# Patient Record
Sex: FEMALE | Race: BLACK OR AFRICAN AMERICAN | NOT HISPANIC OR LATINO | Employment: UNEMPLOYED | URBAN - METROPOLITAN AREA
[De-identification: names, ages, dates, MRNs, and addresses within clinical notes are randomized per-mention and may not be internally consistent; named-entity substitution may affect disease eponyms.]

---

## 2019-06-12 ENCOUNTER — LAB VISIT (OUTPATIENT)
Dept: LAB | Facility: HOSPITAL | Age: 44
End: 2019-06-12
Attending: FAMILY MEDICINE
Payer: MEDICAID

## 2019-06-12 ENCOUNTER — OFFICE VISIT (OUTPATIENT)
Dept: FAMILY MEDICINE | Facility: CLINIC | Age: 44
End: 2019-06-12
Payer: MEDICAID

## 2019-06-12 VITALS
HEIGHT: 64 IN | BODY MASS INDEX: 31.02 KG/M2 | SYSTOLIC BLOOD PRESSURE: 118 MMHG | DIASTOLIC BLOOD PRESSURE: 70 MMHG | HEART RATE: 77 BPM | TEMPERATURE: 98 F | WEIGHT: 181.69 LBS | OXYGEN SATURATION: 99 %

## 2019-06-12 DIAGNOSIS — N63.0 BREAST LUMP: ICD-10-CM

## 2019-06-12 DIAGNOSIS — Z78.9 HISTORY OF INCARCERATION: ICD-10-CM

## 2019-06-12 DIAGNOSIS — Z23 NEED FOR DIPHTHERIA-TETANUS-PERTUSSIS (TDAP) VACCINE: ICD-10-CM

## 2019-06-12 DIAGNOSIS — Z11.3 ROUTINE SCREENING FOR STI (SEXUALLY TRANSMITTED INFECTION): ICD-10-CM

## 2019-06-12 DIAGNOSIS — Z01.419 WELL WOMAN EXAM: Primary | ICD-10-CM

## 2019-06-12 DIAGNOSIS — Z12.39 BREAST CANCER SCREENING: ICD-10-CM

## 2019-06-12 DIAGNOSIS — Z01.419 WELL WOMAN EXAM: ICD-10-CM

## 2019-06-12 DIAGNOSIS — Z12.4 CERVICAL CANCER SCREENING: ICD-10-CM

## 2019-06-12 LAB
BASOPHILS # BLD AUTO: 0.06 K/UL (ref 0–0.2)
BASOPHILS NFR BLD: 0.7 % (ref 0–1.9)
DIFFERENTIAL METHOD: ABNORMAL
EOSINOPHIL # BLD AUTO: 0.2 K/UL (ref 0–0.5)
EOSINOPHIL NFR BLD: 1.9 % (ref 0–8)
ERYTHROCYTE [DISTWIDTH] IN BLOOD BY AUTOMATED COUNT: 13.8 % (ref 11.5–14.5)
HCT VFR BLD AUTO: 42.3 % (ref 37–48.5)
HGB BLD-MCNC: 13.1 G/DL (ref 12–16)
IMM GRANULOCYTES # BLD AUTO: 0.02 K/UL (ref 0–0.04)
IMM GRANULOCYTES NFR BLD AUTO: 0.2 % (ref 0–0.5)
LYMPHOCYTES # BLD AUTO: 3.7 K/UL (ref 1–4.8)
LYMPHOCYTES NFR BLD: 43.2 % (ref 18–48)
MCH RBC QN AUTO: 29.9 PG (ref 27–31)
MCHC RBC AUTO-ENTMCNC: 31 G/DL (ref 32–36)
MCV RBC AUTO: 97 FL (ref 82–98)
MONOCYTES # BLD AUTO: 0.5 K/UL (ref 0.3–1)
MONOCYTES NFR BLD: 5.4 % (ref 4–15)
NEUTROPHILS # BLD AUTO: 4.2 K/UL (ref 1.8–7.7)
NEUTROPHILS NFR BLD: 48.6 % (ref 38–73)
NRBC BLD-RTO: 0 /100 WBC
PLATELET # BLD AUTO: 399 K/UL (ref 150–350)
PMV BLD AUTO: 10.8 FL (ref 9.2–12.9)
RBC # BLD AUTO: 4.38 M/UL (ref 4–5.4)
WBC # BLD AUTO: 8.59 K/UL (ref 3.9–12.7)

## 2019-06-12 PROCEDURE — 86592 SYPHILIS TEST NON-TREP QUAL: CPT

## 2019-06-12 PROCEDURE — 99999 PR PBB SHADOW E&M-NEW PATIENT-LVL IV: ICD-10-PCS | Mod: PBBFAC,,, | Performed by: FAMILY MEDICINE

## 2019-06-12 PROCEDURE — 86703 HIV-1/HIV-2 1 RESULT ANTBDY: CPT

## 2019-06-12 PROCEDURE — 80061 LIPID PANEL: CPT

## 2019-06-12 PROCEDURE — 99386 PR PREVENTIVE VISIT,NEW,40-64: ICD-10-PCS | Mod: S$PBB,25,, | Performed by: FAMILY MEDICINE

## 2019-06-12 PROCEDURE — 99999 PR PBB SHADOW E&M-NEW PATIENT-LVL IV: CPT | Mod: PBBFAC,,, | Performed by: FAMILY MEDICINE

## 2019-06-12 PROCEDURE — 99386 PREV VISIT NEW AGE 40-64: CPT | Mod: S$PBB,25,, | Performed by: FAMILY MEDICINE

## 2019-06-12 PROCEDURE — 82306 VITAMIN D 25 HYDROXY: CPT

## 2019-06-12 PROCEDURE — 85025 COMPLETE CBC W/AUTO DIFF WBC: CPT

## 2019-06-12 PROCEDURE — 84443 ASSAY THYROID STIM HORMONE: CPT

## 2019-06-12 PROCEDURE — 90471 IMMUNIZATION ADMIN: CPT | Mod: PBBFAC,PO

## 2019-06-12 PROCEDURE — 99204 OFFICE O/P NEW MOD 45 MIN: CPT | Mod: PBBFAC,PO | Performed by: FAMILY MEDICINE

## 2019-06-12 PROCEDURE — 83036 HEMOGLOBIN GLYCOSYLATED A1C: CPT

## 2019-06-12 PROCEDURE — 80074 ACUTE HEPATITIS PANEL: CPT

## 2019-06-12 PROCEDURE — 80053 COMPREHEN METABOLIC PANEL: CPT

## 2019-06-12 NOTE — PATIENT INSTRUCTIONS
?Avoid tobacco  ?Be physically active  ?Maintain a healthy weight  ?Eat a diet rich in fruits, vegetables, and whole grains, and low in saturated/trans fat  ?Limit alcohol consumption  ?Protect against sexually transmitted infections  ?Avoid excess sun  RTC as directed during the visit, as needed or in 1 year for a physical with labs prior. Call one month prior to the physical to schedule apt and labs.     Due to history of incarceration will screen for Tb and STI including HIV  TDAP today  Labs today  Mammogram in 3 months  Will request records for mammogram and pap smear  Eat healthy! Low fat diet. Increase exercise      4 Steps for Eating Healthier  Changing the way you eat can improve your health. It can lower your cholesterol and blood pressure, and help you stay at a healthy weight. Your diet doesnt have to be bland and boring to be healthy. Just watch your calories and follow these steps:    1. Eat fewer unhealthy fats  · Choose more fish and lean meats instead of fatty cuts of meat.  · Skip butter and lard, and use less margarine.  · Pass on foods that have palm, coconut, or hydrogenated oils.  · Eat fewer high-fat dairy foods like cheese, ice cream, and whole milk.  · Get a heart-healthy cookbook and try some low-fat recipes.  2. Go light on salt  · Keep the saltshaker off the table.  · Limit high-salt ingredients, such as soy sauce, bouillon, and garlic salt.  · Instead of adding salt when cooking, season your food with herbs and flavorings. Try lemon, garlic, and onion.  · Limit convenience foods, such as boxed or canned foods and restaurant food.  · Read food labels and choose lower-sodium options.  3. Limit sugar  · Pause before you add sugars to pancakes, cereal, coffee, or tea. This includes white and brown table sugar, syrup, honey, and molasses. Cut your usual amount by half.  · Use non-sugar sweeteners. Stevia, aspartame, and sucralose can satisfy a sweet tooth without adding calories.  · Swap out  sugar-filled soda and other drinks. Buy sugar-free or low-calorie beverages. Remember water is always the best choice.  · Read labels and choose foods with less added sugar. Keep in mind that dairy foods and foods with fruit will have some natural sugar.  · Cut the sugar in recipes by 1/3 to 1/2. Boost the flavor with extracts like almond, vanilla, or orange. Or add spices such as cinnamon or nutmeg.  4. Eat more fiber  · Eat fresh fruits and vegetables every day.  · Boost your diet with whole grains. Go for oats, whole-grain rice, and bran.  · Add beans and lentils to your meals.  · Drink more water to match your fiber increase. This is to help prevent constipation.  Date Last Reviewed: 5/11/2015  © 0314-5386 [employer]. [address] [place] [address]. All rights reserved. This information is not intended as a substitute for professional medical care. Always follow your healthcare professional's instructions.        Understanding Fat and Cholesterol  Too much cholesterol in your blood can lead to many problems such as blocked arteries. This can lead to heart attack and stroke. One of the best ways to manage heart and blood vessel disease is to lower your blood cholesterol. Planning meals that are low in saturated fat and cholesterol helps reduce the level of cholesterol in your blood. Below are eating tips to help lower your blood cholesterol levels.  Eat less fat  A healthy goal is to have less than 25% to 35% of your daily calories come from fat. Instead of fats, eat more fruits, whole-grains, and vegetables. This also helps control your weight, and can even reduce your risk for some cancers. There are different kinds of fats in foods. Fats can be saturated, unsaturated, or trans fats. The best fats to choose are unsaturated fats. But fats are high in calories, so eat even unsaturated fats sparingly.  Limit foods high in saturated fats  Saturated fats come from animals and certain plants  (such as coconut and palm). Eating too much saturated fat can raise your blood cholesterol levels and make your artery problems worse. Your goal is to eat less saturated fat. Below are some examples of foods that contain lots of saturated fat:  · Fatty cuts of meat (lamb, ham, beef)  · Many pastries, cakes, cookies, and candies  · Cream, ice cream, sour cream, cheese, and butter, and foods made with them  · Sauces made with butter or cream  · Salad dressings with saturated fats  · Foods that contain palm or coconut oil  Choose unsaturated fats  Unsaturated fats are usually liquid at room temperature. They are better choices for your heart than saturated fat. There are two types of unsaturated fats: polyunsaturated fat and monounsaturated fat. Aim to replace saturated fats with polyunsaturated or monounsaturated fats.  · Polyunsaturated fats are found in corn oil, safflower oil, sunflower oil, and other vegetable oils.  · Monounsaturated fats are found in olive oil, canola oil, and peanut oil. Some margarines and spreads are now made with these oils, too. Avocados are also high in monounsaturated fat.  Of all fats, monounsaturated fats are the least harmful to your heart.  Avoid trans fats  Like saturated fats, trans fats have been linked to heart disease. Even a small amount can harm your health. Trans fats are found in liquid oils that have been changed to be solid at room temperature. Margarine, which is often made from vegetable oil, is one example. Vegetable shortening is another. Trans fats are often found in packaged goods. Check ingredients for the words hydrogenated or partially hydrogenated. They mean the foods contain trans fat.  What about triglycerides?  Triglycerides are a type of fat in your blood. Like cholesterol, high levels of triglycerides can lead to blocked arteries. High triglyceride levels can be reduced 20% to 50%  by limiting added sugars in your diet, susbstituting healthier fats for  saturated and trans fats, getting more physical activity, and losing weight if your are overweight. You may also be advised to avoid or limi alcohol.    Reading food labels  Luckily, most foods now have labels giving you the facts about what youre eating. Reading food labels helps you make healthy choices. Look for the words highlighted below.  · Serving Size. This is the amount of food in 1 serving. If you eat larger portions, be sure to count more of everything: fat, calories, and cholesterol.  · Total Fat. Tells you how many grams (g) of fat are in 1 serving.  · Calories from Fat. This tells you the total number of calories from fat in 1 serving (there are 9 calories per gram of fat). Look for foods with the fewest calories from fat.  · Saturated Fat. Tells you how many grams (g) of saturated fat are in 1 serving.  · Trans Fat. Tells how many grams (g) of trans fat are in 1 serving.  · Cholesterol. Tells you how many milligrams (mg) of cholesterol are in 1 serving.  Date Last Reviewed: 5/11/2015  © 9196-1613 [employer]. [address] [place] [address], [place] [address]. All rights reserved. This information is not intended as a substitute for professional medical care. Always follow your healthcare professional's instructions.

## 2019-06-12 NOTE — PROGRESS NOTES
"Subjective:       Patient ID: Last Torres is a 43 y.o. female.    Chief Complaint: Annual Exam and Establish Care    Last Torres is a 43 y.o. female who presents today to establish care.     Diet: mostly home cooked meals. "Fatty Stuff." She has gained 20 pounds since she left senior living. No soda intake. She drinks sweet tea rarely. No coffee.   Exercise: no exercise currently.     She has a history of right breast lump. It was evaluated with diagnostic mammogram and US. These were all normal. This was done 3 months ago. They wanted follow up in 6 months.     Flu: next flu season  Tdap: ordered  Labs: ordered    C-scope: at 51    Mammogram: had this done 3 months ago. Records pending.   Pap: had a pap smear done in residential, 6 months ago. She did 15 years and just got out 6 months ago. Desires gynecology. Will refer.     PMHx: reviewed in EMR and updated  Meds: reviewed in EMR and updated  Shx: reviewed in EMR and updated  FMHx: reviewed in EMR and updated  Social: got out of [facility] 3 months ago. She is living with her mom. Her daughter and granddaughter are with her today. No pets at home. No smokers at home. She works at [employer] in [employer]. No IV drug use. No tattoos in senior living.       Review of Systems   Constitutional: Negative for activity change and appetite change.   Respiratory: Negative for cough, shortness of breath, wheezing and stridor.    Cardiovascular: Negative for chest pain.   Gastrointestinal: Negative for abdominal pain, diarrhea, nausea, rectal pain and vomiting.   Genitourinary: Negative for difficulty urinating and dysuria.   Musculoskeletal: Negative for neck stiffness.   Skin: Negative for color change and pallor.   Neurological: Negative for dizziness and light-headedness.   Psychiatric/Behavioral: Negative for behavioral problems.         Health Maintenance Due   Topic Date Due    Lipid Panel  1975    Mammogram  10/26/2016         Objective:     Vitals:    06/12/19 " 1507   BP: 118/70   Pulse: 77   Temp: 97.7 °F (36.5 °C)        Physical Exam   Constitutional: She is oriented to person, place, and time. She appears well-developed and well-nourished.   HENT:   Head: Normocephalic and atraumatic.   Right Ear: Tympanic membrane, external ear and ear canal normal.   Left Ear: Tympanic membrane, external ear and ear canal normal.   Nose: Nose normal.   Mouth/Throat: Mucous membranes are normal.   Mild cobblestoning noted   Eyes: Pupils are equal, round, and reactive to light. Conjunctivae are normal.   Neck: Normal range of motion.   Cardiovascular: Normal rate, regular rhythm and normal heart sounds.   Pulmonary/Chest: Effort normal and breath sounds normal. No respiratory distress.   Abdominal: Soft. Bowel sounds are normal. She exhibits no distension.   Genitourinary:   Genitourinary Comments: deferred    Musculoskeletal: She exhibits no edema.   Neurological: She is alert and oriented to person, place, and time. No cranial nerve deficit or sensory deficit. She exhibits normal muscle tone.   Skin: Skin is warm and dry.   Psychiatric: She has a normal mood and affect. Her speech is normal and behavior is normal. Judgment and thought content normal.   Nursing note and vitals reviewed.      Assessment:       1. Well woman exam    2. Cervical cancer screening    3. Breast lump    4. Breast cancer screening    5. BMI 31.0-31.9,adult    6. Need for diphtheria-tetanus-pertussis (Tdap) vaccine    7. Routine screening for STI (sexually transmitted infection)    8. History of incarceration. Released in March 2019        Plan:       Due to history of incarceration will screen for Tb and STI including HIV  TDAP today  Labs today  Mammogram in 3 months  Will request records for mammogram and pap smear  Eat healthy! Low fat diet. Increase exercise    Well woman exam  ?Avoid tobacco  ?Be physically active  ?Maintain a healthy weight  ?Eat a diet rich in fruits, vegetables, and whole grains, and  low in saturated/trans fat  ?Limit alcohol consumption  ?Protect against sexually transmitted infections  ?Avoid excess sun  RTC as directed during the visit, as needed or in 1 year for a physical with labs prior. Call one month prior to the physical to schedule apt and labs.   -     CBC auto differential; Future; Expected date: 06/12/2019  -     Comprehensive metabolic panel; Future; Expected date: 06/12/2019  -     Hemoglobin A1c; Future; Expected date: 06/12/2019  -     Lipid panel; Future; Expected date: 06/12/2019  -     TSH; Future; Expected date: 06/12/2019  -     Vitamin D; Future; Expected date: 06/12/2019    Cervical cancer screening  -     Ambulatory referral to Obstetrics / Gynecology    Breast lump    Breast cancer screening  -     Mammo Digital Screening Bilat with Tomosynthesis with CAD; Future; Expected date: 06/12/2019    BMI 31.0-31.9,adult    Need for diphtheria-tetanus-pertussis (Tdap) vaccine  -     Tdap Vaccine    Routine screening for STI (sexually transmitted infection)  -     HIV 1/2 Ag/Ab (4th Gen); Future; Expected date: 06/12/2019  -     RPR; Future; Expected date: 06/12/2019  -     Hepatitis panel, acute; Future; Expected date: 06/12/2019    History of incarceration. Released in March 2019  -     HIV 1/2 Ag/Ab (4th Gen); Future; Expected date: 06/12/2019  -     RPR; Future; Expected date: 06/12/2019  -     QUANTIFERON GOLD TB; Future; Expected date: 06/12/2019  -     Hepatitis panel, acute; Future; Expected date: 06/12/2019

## 2019-06-13 LAB
25(OH)D3+25(OH)D2 SERPL-MCNC: 18 NG/ML (ref 30–96)
ALBUMIN SERPL BCP-MCNC: 3.6 G/DL (ref 3.5–5.2)
ALP SERPL-CCNC: 83 U/L (ref 55–135)
ALT SERPL W/O P-5'-P-CCNC: 6 U/L (ref 10–44)
ANION GAP SERPL CALC-SCNC: 13 MMOL/L (ref 8–16)
AST SERPL-CCNC: 14 U/L (ref 10–40)
BILIRUB SERPL-MCNC: 0.2 MG/DL (ref 0.1–1)
BUN SERPL-MCNC: 7 MG/DL (ref 6–20)
CALCIUM SERPL-MCNC: 8.9 MG/DL (ref 8.7–10.5)
CHLORIDE SERPL-SCNC: 105 MMOL/L (ref 95–110)
CHOLEST SERPL-MCNC: 146 MG/DL (ref 120–199)
CHOLEST/HDLC SERPL: 2.1 {RATIO} (ref 2–5)
CO2 SERPL-SCNC: 22 MMOL/L (ref 23–29)
CREAT SERPL-MCNC: 0.8 MG/DL (ref 0.5–1.4)
EST. GFR  (AFRICAN AMERICAN): >60 ML/MIN/1.73 M^2
EST. GFR  (NON AFRICAN AMERICAN): >60 ML/MIN/1.73 M^2
ESTIMATED AVG GLUCOSE: 123 MG/DL (ref 68–131)
GLUCOSE SERPL-MCNC: 83 MG/DL (ref 70–110)
HAV IGM SERPL QL IA: NEGATIVE
HBA1C MFR BLD HPLC: 5.9 % (ref 4–5.6)
HBV CORE IGM SERPL QL IA: NEGATIVE
HBV SURFACE AG SERPL QL IA: NEGATIVE
HCV AB SERPL QL IA: NEGATIVE
HDLC SERPL-MCNC: 70 MG/DL (ref 40–75)
HDLC SERPL: 47.9 % (ref 20–50)
HIV 1+2 AB+HIV1 P24 AG SERPL QL IA: NEGATIVE
LDLC SERPL CALC-MCNC: 66.4 MG/DL (ref 63–159)
NONHDLC SERPL-MCNC: 76 MG/DL
POTASSIUM SERPL-SCNC: 3.6 MMOL/L (ref 3.5–5.1)
PROT SERPL-MCNC: 7.6 G/DL (ref 6–8.4)
RPR SER QL: NORMAL
SODIUM SERPL-SCNC: 140 MMOL/L (ref 136–145)
TRIGL SERPL-MCNC: 48 MG/DL (ref 30–150)
TSH SERPL DL<=0.005 MIU/L-ACNC: 1.8 UIU/ML (ref 0.4–4)

## 2019-07-11 ENCOUNTER — TELEPHONE (OUTPATIENT)
Dept: ADMINISTRATIVE | Facility: HOSPITAL | Age: 44
End: 2019-07-11

## 2019-09-12 ENCOUNTER — OFFICE VISIT (OUTPATIENT)
Dept: OBSTETRICS AND GYNECOLOGY | Facility: CLINIC | Age: 44
End: 2019-09-12
Payer: MEDICAID

## 2019-09-12 VITALS — WEIGHT: 181.44 LBS | BODY MASS INDEX: 30.98 KG/M2 | HEIGHT: 64 IN

## 2019-09-12 DIAGNOSIS — Z12.31 SCREENING MAMMOGRAM, ENCOUNTER FOR: Primary | ICD-10-CM

## 2019-09-12 DIAGNOSIS — N63.0 BREAST MASS: ICD-10-CM

## 2019-09-12 PROCEDURE — 99999 PR PBB SHADOW E&M-EST. PATIENT-LVL III: CPT | Mod: PBBFAC,,, | Performed by: OBSTETRICS & GYNECOLOGY

## 2019-09-12 PROCEDURE — 99202 OFFICE O/P NEW SF 15 MIN: CPT | Mod: S$PBB,,, | Performed by: OBSTETRICS & GYNECOLOGY

## 2019-09-12 PROCEDURE — 99202 PR OFFICE/OUTPT VISIT, NEW, LEVL II, 15-29 MIN: ICD-10-PCS | Mod: S$PBB,,, | Performed by: OBSTETRICS & GYNECOLOGY

## 2019-09-12 PROCEDURE — 99999 PR PBB SHADOW E&M-EST. PATIENT-LVL III: ICD-10-PCS | Mod: PBBFAC,,, | Performed by: OBSTETRICS & GYNECOLOGY

## 2019-09-12 PROCEDURE — 99213 OFFICE O/P EST LOW 20 MIN: CPT | Mod: PBBFAC,PO | Performed by: OBSTETRICS & GYNECOLOGY

## 2019-09-12 NOTE — PROGRESS NOTES
"Chief Complaint   Patient presents with    Well Woman       HISTORY OF PRESENT ILLNESS:   Last Torres is a 43 y.o. female  who presents to [facility]. She had annual in March at [facility] that was normal.  Patient's last menstrual period was 2019..  She has no complaints. Cycles are "regular. Declines STD testing as she just had it done. Has BTL for birth control.      Past Medical History:   Diagnosis Date    Incarceration 2019          Past Surgical History:   Procedure Laterality Date     SECTION      x2    TUBAL LIGATION           Social History     Socioeconomic History    Marital status: Single     Spouse name: Not on file    Number of children: Not on file    Years of education: Not on file    Highest education level: Not on file   Occupational History    Not on file   Social Needs    Financial resource strain: Not on file    Food insecurity:     Worry: Not on file     Inability: Not on file    Transportation needs:     Medical: Not on file     Non-medical: Not on file   Tobacco Use    Smoking status: Former Smoker     Packs/day: 0.33     Years: 15.00     Pack years: 4.95     Types: Cigarettes     Last attempt to quit: 2009     Years since quitting: 10.7    Smokeless tobacco: Never Used    Tobacco comment: quit 10 years ago   Substance and Sexual Activity    Alcohol use: Yes     Frequency: 2-4 times a month     Drinks per session: 1 or 2     Binge frequency: Never     Comment: socially    Drug use: Never    Sexual activity: Yes     Partners: Male   Lifestyle    Physical activity:     Days per week: Not on file     Minutes per session: Not on file    Stress: Not on file   Relationships    Social connections:     Talks on phone: Not on file     Gets together: Not on file     Attends Yarsani service: Not on file     Active member of club or organization: Not on file     Attends meetings of clubs or organizations: Not on file     Relationship status: Not on file " "  Other Topics Concern    Not on file   Social History Narrative    20: got out of group home 3 months ago. She is living with her mom. Her daughter and granddaughter are with her today. No pets at home. No smokers at home. She works at [employer] in [place]. No IV drug use. No tattoos in residential.        Family History   Problem Relation Age of Onset    Hypertension Mother     Diabetes Mother     Hyperlipidemia Mother     Thyroid cancer Mother     Hypertension Sister     Hypertension Brother     Diabetes Brother     Kidney failure Brother     Blindness Brother     Breast cancer Maternal Aunt     Colon cancer Neg Hx          OB History    Para Term  AB Living   2 2 2     2   SAB TAB Ectopic Multiple Live Births           1      # Outcome Date GA Lbr Ochoa/2nd Weight Sex Delivery Anes PTL Lv   2 Term     M CS-Unspec  N FLAQUITA   1 Term     F CS-Unspec  N        GYN HISTORY:  PAP History: Denies abnormal Paps, last was at [facility] 3/2019  Infection History:Denies STDs. Denies PID.  Benign History: Denies uterine fibroids. Denies ovarian cysts. Denies endometriosis Denies other conditions.  Cancer History: Denies cervical cancer. Denies uterine cancer or hyperplasia. Denies ovarian cancer. Denies vulvar cancer or pre-cancer. Denies vaginal cancer or pre-cancer. Denies breast cancer. Denies colon cancer.  Cycle: , light, no cramping   Had BTL in     ROS:  GENERAL: Denies weight gain or weight loss. Feeling well overall.   SKIN: Denies rash or lesions.   HEAD: Denies headache.   NODES: Denies enlarged lymph nodes.   CHEST: Denies shortness of breath.   ABDOMEN: No abdominal pain, constipation, diarrhea, nausea, vomiting or rectal bleeding.   URINARY: No frequency, dysuria, hematuria, or burning on urination.  REPRODUCTIVE: See HPI.   BREASTS: The patient denies pain, lumps, or nipple discharge.       Ht 5' 4" (1.626 m)   Wt 82.3 kg (181 lb 7 oz)   LMP 2019   BMI 31.14 kg/m²      " APPEARANCE: Well nourished, well developed, in no acute distress.  NECK: Neck symmetric without  thyromegaly.  NODES: No inguinal, cervical lymph node enlargement.  CHEST: Lungs clear to auscultation.  HEART: Regular rate and rhythm, no murmurs, rubs or gallops.  ABDOMEN: Soft. No tenderness or masses. No hernias. No hepatosplenomegaly.   BREASTS: Symmetrical, no skin changes or visible lesions. 1 cm freely mobile mass in right breast at 3 oclock about 7 cm from nipple, close to chest wall but no other masses, denies nipple discharge or adenopathy bilaterally.  PELVIC: deferred until annual       1. Screening mammogram, encounter for    2. Breast mass        Plan:  1. Routine gyn annual  s/p normal breast exam and MMG ordered.   Pap with HPV cotesting up to date.   STD testing: GC/CT/trich, syphilis, HBV/HCV and HIV declined  2. Will get diagnostic MMG. She reports it has been there for a few years and doesn't seem to be changing in size. Was told it was fibrocystic change due to caffeine.       F/u in 1 yr or RPN

## 2019-09-12 NOTE — LETTER
September 12, 2019      Virgil Marrero, DO  [address]  [place] [address]           [facility]  [address]  [place] [address]  Phone: [phone]          Patient: Lsat Torres   MR Number: [id number]   YOB: 1975   Date of Visit: 9/12/2019       Dear Dr. Virgil Marrero:    Thank you for referring Last Torres to me for evaluation. Attached you will find relevant portions of my assessment and plan of care.    If you have questions, please do not hesitate to call me. I look forward to following Last Torres along with you.    Sincerely,    Malia Amador MD    Enclosure  CC:  No Recipients    If you would like to receive this communication electronically, please contact [email or web address] or [phone] to request more information on [employer] access.    For providers and/or their staff who would like to refer a patient to [facility], please contact us through our one-stop-shop provider referral line, Marc Schwartz, at [phone].    If you feel you have received this communication in error or would no longer like to receive these types of communications, please e-mail [email or web address]

## 2020-04-29 ENCOUNTER — TELEPHONE (OUTPATIENT)
Dept: OBSTETRICS AND GYNECOLOGY | Facility: CLINIC | Age: 45
End: 2020-04-29

## 2020-04-29 DIAGNOSIS — Z30.013 ENCOUNTER FOR INITIAL PRESCRIPTION OF INJECTABLE CONTRACEPTIVE: Primary | ICD-10-CM

## 2020-04-29 RX ORDER — MEDROXYPROGESTERONE ACETATE 150 MG/ML
150 INJECTION, SUSPENSION INTRAMUSCULAR
Qty: 1 ML | Refills: 4 | Status: SHIPPED | OUTPATIENT
Start: 2020-04-29 | End: 2021-06-18

## 2020-04-29 NOTE — TELEPHONE ENCOUNTER
Called patient, patient is complaining of flu like symptoms during her cycle. Patient stated every time she starts her cycle she has chills, body aches, can not keep anything down, and diarrhea. Patient stated she called her PCP and they told her to reach out to her OBGYN. Patient stated the flu like symptoms are only when she is on her cycle. Patient would like to know what provider recommends.

## 2020-04-29 NOTE — TELEPHONE ENCOUNTER
----- Message from Gifty Temple sent at 4/29/2020 10:42 AM CDT -----  Contact: Pt  Type:  Same Day Appointment Request    Caller is requesting a same day appointment.  Caller declined first available appointment listed below.    Name of Caller: Last Torres  When is the first available appointment? n/a  Symptoms: flu like symptoms when her cycle come down  Best Call Back Number [phone]  Additional Information: Need something for her hormones

## 2020-04-29 NOTE — TELEPHONE ENCOUNTER
I spoke to the patient. Patient of dr. morgan.  She wants to start a birth control to regulate the symptoms with her cycle.  Patient used contraception when she was 16 yrs old.  Reports that her cycle is ending now.  Reports spotting for about 3 days.  Reports that her cycles are becoming quite painful.  She desires to try depo provera.  rx was sent to her pharmacy.  Patient will self inject.  Will send copy of depo provera schedule to her in the mail.    Dr sheets

## 2020-10-05 ENCOUNTER — PATIENT MESSAGE (OUTPATIENT)
Dept: ADMINISTRATIVE | Facility: HOSPITAL | Age: 45
End: 2020-10-05

## 2020-12-16 ENCOUNTER — TELEPHONE (OUTPATIENT)
Dept: OBSTETRICS AND GYNECOLOGY | Facility: CLINIC | Age: 45
End: 2020-12-16

## 2020-12-16 NOTE — TELEPHONE ENCOUNTER
Appointment Request From: Last Torres      With Provider: Malia Amador MD [[facility]]      Preferred Date Range: Any date 12/23/2020 or later      Preferred Times: Any Time      Reason for visit: Vaginal bleeding      Comments:   Reoccurring vaginal bleeding

## 2021-01-05 ENCOUNTER — PATIENT MESSAGE (OUTPATIENT)
Dept: ADMINISTRATIVE | Facility: HOSPITAL | Age: 46
End: 2021-01-05

## 2021-01-15 ENCOUNTER — TELEPHONE (OUTPATIENT)
Dept: FAMILY MEDICINE | Facility: CLINIC | Age: 46
End: 2021-01-15

## 2021-01-20 ENCOUNTER — LAB VISIT (OUTPATIENT)
Dept: LAB | Facility: HOSPITAL | Age: 46
End: 2021-01-20
Attending: OBSTETRICS & GYNECOLOGY
Payer: MEDICAID

## 2021-01-20 ENCOUNTER — OFFICE VISIT (OUTPATIENT)
Dept: OBSTETRICS AND GYNECOLOGY | Facility: CLINIC | Age: 46
End: 2021-01-20
Payer: MEDICAID

## 2021-01-20 VITALS
DIASTOLIC BLOOD PRESSURE: 70 MMHG | SYSTOLIC BLOOD PRESSURE: 120 MMHG | WEIGHT: 163 LBS | BODY MASS INDEX: 27.83 KG/M2 | HEIGHT: 64 IN

## 2021-01-20 DIAGNOSIS — Z12.4 PAP SMEAR FOR CERVICAL CANCER SCREENING: ICD-10-CM

## 2021-01-20 DIAGNOSIS — Z12.31 SCREENING MAMMOGRAM, ENCOUNTER FOR: ICD-10-CM

## 2021-01-20 DIAGNOSIS — N93.9 ABNORMAL UTERINE BLEEDING (AUB): ICD-10-CM

## 2021-01-20 DIAGNOSIS — Z01.419 ENCOUNTER FOR ANNUAL ROUTINE GYNECOLOGICAL EXAMINATION: Primary | ICD-10-CM

## 2021-01-20 LAB
B-HCG UR QL: NEGATIVE
BASOPHILS # BLD AUTO: 0.05 K/UL (ref 0–0.2)
BASOPHILS NFR BLD: 0.5 % (ref 0–1.9)
CTP QC/QA: YES
DIFFERENTIAL METHOD: ABNORMAL
EOSINOPHIL # BLD AUTO: 0.1 K/UL (ref 0–0.5)
EOSINOPHIL NFR BLD: 0.7 % (ref 0–8)
ERYTHROCYTE [DISTWIDTH] IN BLOOD BY AUTOMATED COUNT: 14.4 % (ref 11.5–14.5)
HCT VFR BLD AUTO: 39.7 % (ref 37–48.5)
HGB BLD-MCNC: 13.5 G/DL (ref 12–16)
IMM GRANULOCYTES # BLD AUTO: 0.04 K/UL (ref 0–0.04)
IMM GRANULOCYTES NFR BLD AUTO: 0.4 % (ref 0–0.5)
LYMPHOCYTES # BLD AUTO: 4 K/UL (ref 1–4.8)
LYMPHOCYTES NFR BLD: 40.6 % (ref 18–48)
MCH RBC QN AUTO: 31.4 PG (ref 27–31)
MCHC RBC AUTO-ENTMCNC: 34 G/DL (ref 32–36)
MCV RBC AUTO: 92 FL (ref 82–98)
MONOCYTES # BLD AUTO: 0.4 K/UL (ref 0.3–1)
MONOCYTES NFR BLD: 3.8 % (ref 4–15)
NEUTROPHILS # BLD AUTO: 5.3 K/UL (ref 1.8–7.7)
NEUTROPHILS NFR BLD: 54 % (ref 38–73)
NRBC BLD-RTO: 0 /100 WBC
PLATELET # BLD AUTO: 350 K/UL (ref 150–350)
PMV BLD AUTO: 10.8 FL (ref 9.2–12.9)
RBC # BLD AUTO: 4.3 M/UL (ref 4–5.4)
TSH SERPL DL<=0.005 MIU/L-ACNC: 1.75 UIU/ML (ref 0.4–4)
WBC # BLD AUTO: 9.73 K/UL (ref 3.9–12.7)

## 2021-01-20 PROCEDURE — 99396 PREV VISIT EST AGE 40-64: CPT | Mod: S$PBB,,, | Performed by: OBSTETRICS & GYNECOLOGY

## 2021-01-20 PROCEDURE — 84443 ASSAY THYROID STIM HORMONE: CPT

## 2021-01-20 PROCEDURE — 36415 COLL VENOUS BLD VENIPUNCTURE: CPT

## 2021-01-20 PROCEDURE — 99213 OFFICE O/P EST LOW 20 MIN: CPT | Mod: PBBFAC,PO | Performed by: OBSTETRICS & GYNECOLOGY

## 2021-01-20 PROCEDURE — 88175 CYTOPATH C/V AUTO FLUID REDO: CPT

## 2021-01-20 PROCEDURE — 99396 PR PREVENTIVE VISIT,EST,40-64: ICD-10-PCS | Mod: S$PBB,,, | Performed by: OBSTETRICS & GYNECOLOGY

## 2021-01-20 PROCEDURE — 87624 HPV HI-RISK TYP POOLED RSLT: CPT

## 2021-01-20 PROCEDURE — 85025 COMPLETE CBC W/AUTO DIFF WBC: CPT

## 2021-01-20 PROCEDURE — 99999 PR PBB SHADOW E&M-EST. PATIENT-LVL III: CPT | Mod: PBBFAC,,, | Performed by: OBSTETRICS & GYNECOLOGY

## 2021-01-20 PROCEDURE — 99999 PR PBB SHADOW E&M-EST. PATIENT-LVL III: ICD-10-PCS | Mod: PBBFAC,,, | Performed by: OBSTETRICS & GYNECOLOGY

## 2021-01-20 RX ORDER — MEDROXYPROGESTERONE ACETATE 150 MG/ML
150 INJECTION, SUSPENSION INTRAMUSCULAR
Status: SHIPPED | OUTPATIENT
Start: 2021-01-20

## 2021-01-22 ENCOUNTER — PATIENT MESSAGE (OUTPATIENT)
Dept: OBSTETRICS AND GYNECOLOGY | Facility: CLINIC | Age: 46
End: 2021-01-22

## 2021-01-25 ENCOUNTER — HOSPITAL ENCOUNTER (OUTPATIENT)
Dept: RADIOLOGY | Facility: HOSPITAL | Age: 46
Discharge: HOME | End: 2021-01-25
Attending: OBSTETRICS & GYNECOLOGY
Payer: MEDICAID

## 2021-01-25 DIAGNOSIS — N93.9 ABNORMAL UTERINE BLEEDING (AUB): ICD-10-CM

## 2021-01-25 PROCEDURE — 76830 TRANSVAGINAL US NON-OB: CPT | Mod: 26,,, | Performed by: RADIOLOGY

## 2021-01-25 PROCEDURE — 76856 US EXAM PELVIC COMPLETE: CPT | Mod: 26,,, | Performed by: RADIOLOGY

## 2021-01-25 PROCEDURE — 76856 US PELVIS COMP WITH TRANSVAG NON-OB (XPD): ICD-10-PCS | Mod: 26,,, | Performed by: RADIOLOGY

## 2021-01-25 PROCEDURE — 76856 US EXAM PELVIC COMPLETE: CPT | Mod: TC

## 2021-01-25 PROCEDURE — 76830 US PELVIS COMP WITH TRANSVAG NON-OB (XPD): ICD-10-PCS | Mod: 26,,, | Performed by: RADIOLOGY

## 2021-01-27 ENCOUNTER — PATIENT MESSAGE (OUTPATIENT)
Dept: OBSTETRICS AND GYNECOLOGY | Facility: CLINIC | Age: 46
End: 2021-01-27

## 2021-01-28 LAB
HPV HR 12 DNA SPEC QL NAA+PROBE: POSITIVE
HPV16 AG SPEC QL: NEGATIVE
HPV18 DNA SPEC QL NAA+PROBE: NEGATIVE

## 2021-02-05 ENCOUNTER — PATIENT MESSAGE (OUTPATIENT)
Dept: OBSTETRICS AND GYNECOLOGY | Facility: CLINIC | Age: 46
End: 2021-02-05

## 2021-02-15 LAB
FINAL PATHOLOGIC DIAGNOSIS: NORMAL
Lab: NORMAL

## 2021-02-22 ENCOUNTER — TELEPHONE (OUTPATIENT)
Dept: OBSTETRICS AND GYNECOLOGY | Facility: CLINIC | Age: 46
End: 2021-02-22

## 2021-02-25 ENCOUNTER — HOSPITAL ENCOUNTER (OUTPATIENT)
Dept: RADIOLOGY | Facility: HOSPITAL | Age: 46
Discharge: HOME | End: 2021-02-25
Attending: OBSTETRICS & GYNECOLOGY
Payer: MEDICAID

## 2021-02-25 ENCOUNTER — TELEPHONE (OUTPATIENT)
Dept: OBSTETRICS AND GYNECOLOGY | Facility: CLINIC | Age: 46
End: 2021-02-25

## 2021-02-25 DIAGNOSIS — Z01.419 ENCOUNTER FOR ANNUAL ROUTINE GYNECOLOGICAL EXAMINATION: ICD-10-CM

## 2021-02-25 DIAGNOSIS — Z12.31 SCREENING MAMMOGRAM, ENCOUNTER FOR: ICD-10-CM

## 2021-02-25 PROCEDURE — 77067 MAMMO DIGITAL SCREENING BILAT WITH TOMO: ICD-10-PCS | Mod: 26,,, | Performed by: RADIOLOGY

## 2021-02-25 PROCEDURE — 77067 SCR MAMMO BI INCL CAD: CPT | Mod: TC

## 2021-02-25 PROCEDURE — 77063 MAMMO DIGITAL SCREENING BILAT WITH TOMO: ICD-10-PCS | Mod: 26,,, | Performed by: RADIOLOGY

## 2021-02-25 PROCEDURE — 77063 BREAST TOMOSYNTHESIS BI: CPT | Mod: 26,,, | Performed by: RADIOLOGY

## 2021-02-25 PROCEDURE — 77067 SCR MAMMO BI INCL CAD: CPT | Mod: 26,,, | Performed by: RADIOLOGY

## 2021-02-26 ENCOUNTER — PATIENT MESSAGE (OUTPATIENT)
Dept: OBSTETRICS AND GYNECOLOGY | Facility: CLINIC | Age: 46
End: 2021-02-26

## 2021-03-17 ENCOUNTER — LAB VISIT (OUTPATIENT)
Dept: LAB | Facility: HOSPITAL | Age: 46
End: 2021-03-17
Attending: FAMILY MEDICINE
Payer: MEDICAID

## 2021-03-17 ENCOUNTER — OFFICE VISIT (OUTPATIENT)
Dept: FAMILY MEDICINE | Facility: CLINIC | Age: 46
End: 2021-03-17
Payer: MEDICAID

## 2021-03-17 VITALS
BODY MASS INDEX: 27.48 KG/M2 | SYSTOLIC BLOOD PRESSURE: 102 MMHG | WEIGHT: 160.94 LBS | TEMPERATURE: 97 F | DIASTOLIC BLOOD PRESSURE: 64 MMHG | HEIGHT: 64 IN

## 2021-03-17 DIAGNOSIS — R73.01 ELEVATED FASTING GLUCOSE: ICD-10-CM

## 2021-03-17 DIAGNOSIS — E55.9 VITAMIN D DEFICIENCY: ICD-10-CM

## 2021-03-17 DIAGNOSIS — Z13.6 ENCOUNTER FOR LIPID SCREENING FOR CARDIOVASCULAR DISEASE: ICD-10-CM

## 2021-03-17 DIAGNOSIS — Z01.419 WELL WOMAN EXAM: Primary | ICD-10-CM

## 2021-03-17 DIAGNOSIS — Z13.220 ENCOUNTER FOR LIPID SCREENING FOR CARDIOVASCULAR DISEASE: ICD-10-CM

## 2021-03-17 DIAGNOSIS — Z01.419 WELL WOMAN EXAM: ICD-10-CM

## 2021-03-17 DIAGNOSIS — Z86.19 HISTORY OF HPV INFECTION: ICD-10-CM

## 2021-03-17 LAB
BASOPHILS # BLD AUTO: 0.05 K/UL (ref 0–0.2)
BASOPHILS NFR BLD: 0.5 % (ref 0–1.9)
DIFFERENTIAL METHOD: ABNORMAL
EOSINOPHIL # BLD AUTO: 0.1 K/UL (ref 0–0.5)
EOSINOPHIL NFR BLD: 0.9 % (ref 0–8)
ERYTHROCYTE [DISTWIDTH] IN BLOOD BY AUTOMATED COUNT: 14.7 % (ref 11.5–14.5)
HCT VFR BLD AUTO: 40.2 % (ref 37–48.5)
HGB BLD-MCNC: 13.2 G/DL (ref 12–16)
IMM GRANULOCYTES # BLD AUTO: 0.03 K/UL (ref 0–0.04)
IMM GRANULOCYTES NFR BLD AUTO: 0.3 % (ref 0–0.5)
LYMPHOCYTES # BLD AUTO: 3.5 K/UL (ref 1–4.8)
LYMPHOCYTES NFR BLD: 36.2 % (ref 18–48)
MCH RBC QN AUTO: 30.8 PG (ref 27–31)
MCHC RBC AUTO-ENTMCNC: 32.8 G/DL (ref 32–36)
MCV RBC AUTO: 94 FL (ref 82–98)
MONOCYTES # BLD AUTO: 0.3 K/UL (ref 0.3–1)
MONOCYTES NFR BLD: 3.6 % (ref 4–15)
NEUTROPHILS # BLD AUTO: 5.6 K/UL (ref 1.8–7.7)
NEUTROPHILS NFR BLD: 58.5 % (ref 38–73)
NRBC BLD-RTO: 0 /100 WBC
PLATELET # BLD AUTO: 362 K/UL (ref 150–350)
PMV BLD AUTO: 10.6 FL (ref 9.2–12.9)
RBC # BLD AUTO: 4.29 M/UL (ref 4–5.4)
WBC # BLD AUTO: 9.55 K/UL (ref 3.9–12.7)

## 2021-03-17 PROCEDURE — 99396 PREV VISIT EST AGE 40-64: CPT | Mod: S$PBB,,, | Performed by: FAMILY MEDICINE

## 2021-03-17 PROCEDURE — 99213 OFFICE O/P EST LOW 20 MIN: CPT | Mod: PBBFAC,PO | Performed by: FAMILY MEDICINE

## 2021-03-17 PROCEDURE — 82306 VITAMIN D 25 HYDROXY: CPT | Performed by: FAMILY MEDICINE

## 2021-03-17 PROCEDURE — 80061 LIPID PANEL: CPT | Performed by: FAMILY MEDICINE

## 2021-03-17 PROCEDURE — 85025 COMPLETE CBC W/AUTO DIFF WBC: CPT | Performed by: FAMILY MEDICINE

## 2021-03-17 PROCEDURE — 99396 PR PREVENTIVE VISIT,EST,40-64: ICD-10-PCS | Mod: S$PBB,,, | Performed by: FAMILY MEDICINE

## 2021-03-17 PROCEDURE — 83036 HEMOGLOBIN GLYCOSYLATED A1C: CPT | Performed by: FAMILY MEDICINE

## 2021-03-17 PROCEDURE — 36415 COLL VENOUS BLD VENIPUNCTURE: CPT | Mod: PO | Performed by: FAMILY MEDICINE

## 2021-03-17 PROCEDURE — 99999 PR PBB SHADOW E&M-EST. PATIENT-LVL III: CPT | Mod: PBBFAC,,, | Performed by: FAMILY MEDICINE

## 2021-03-17 PROCEDURE — 99999 PR PBB SHADOW E&M-EST. PATIENT-LVL III: ICD-10-PCS | Mod: PBBFAC,,, | Performed by: FAMILY MEDICINE

## 2021-03-17 PROCEDURE — 80053 COMPREHEN METABOLIC PANEL: CPT | Performed by: FAMILY MEDICINE

## 2021-03-18 DIAGNOSIS — E55.9 VITAMIN D DEFICIENCY: Primary | ICD-10-CM

## 2021-03-18 LAB
25(OH)D3+25(OH)D2 SERPL-MCNC: 9 NG/ML (ref 30–96)
ALBUMIN SERPL BCP-MCNC: 3.6 G/DL (ref 3.5–5.2)
ALP SERPL-CCNC: 81 U/L (ref 55–135)
ALT SERPL W/O P-5'-P-CCNC: 7 U/L (ref 10–44)
ANION GAP SERPL CALC-SCNC: 9 MMOL/L (ref 8–16)
AST SERPL-CCNC: 12 U/L (ref 10–40)
BILIRUB SERPL-MCNC: 0.2 MG/DL (ref 0.1–1)
BUN SERPL-MCNC: 9 MG/DL (ref 6–20)
CALCIUM SERPL-MCNC: 8.9 MG/DL (ref 8.7–10.5)
CHLORIDE SERPL-SCNC: 106 MMOL/L (ref 95–110)
CHOLEST SERPL-MCNC: 135 MG/DL (ref 120–199)
CHOLEST/HDLC SERPL: 2.4 {RATIO} (ref 2–5)
CO2 SERPL-SCNC: 26 MMOL/L (ref 23–29)
CREAT SERPL-MCNC: 0.8 MG/DL (ref 0.5–1.4)
EST. GFR  (AFRICAN AMERICAN): >60 ML/MIN/1.73 M^2
EST. GFR  (NON AFRICAN AMERICAN): >60 ML/MIN/1.73 M^2
ESTIMATED AVG GLUCOSE: 111 MG/DL (ref 68–131)
GLUCOSE SERPL-MCNC: 111 MG/DL (ref 70–110)
HBA1C MFR BLD: 5.5 % (ref 4–5.6)
HDLC SERPL-MCNC: 57 MG/DL (ref 40–75)
HDLC SERPL: 42.2 % (ref 20–50)
LDLC SERPL CALC-MCNC: 70.6 MG/DL (ref 63–159)
NONHDLC SERPL-MCNC: 78 MG/DL
POTASSIUM SERPL-SCNC: 3.5 MMOL/L (ref 3.5–5.1)
PROT SERPL-MCNC: 7.5 G/DL (ref 6–8.4)
SODIUM SERPL-SCNC: 141 MMOL/L (ref 136–145)
TRIGL SERPL-MCNC: 37 MG/DL (ref 30–150)

## 2021-03-18 RX ORDER — ERGOCALCIFEROL 1.25 MG/1
50000 CAPSULE ORAL
Qty: 12 CAPSULE | Refills: 0 | Status: SHIPPED | OUTPATIENT
Start: 2021-03-18

## 2021-05-04 ENCOUNTER — PATIENT MESSAGE (OUTPATIENT)
Dept: RESEARCH | Facility: HOSPITAL | Age: 46
End: 2021-05-04

## 2021-05-14 ENCOUNTER — PATIENT MESSAGE (OUTPATIENT)
Dept: OBSTETRICS AND GYNECOLOGY | Facility: CLINIC | Age: 46
End: 2021-05-14

## 2021-05-14 ENCOUNTER — TELEPHONE (OUTPATIENT)
Dept: OBSTETRICS AND GYNECOLOGY | Facility: CLINIC | Age: 46
End: 2021-05-14

## 2021-05-17 ENCOUNTER — TELEPHONE (OUTPATIENT)
Dept: OBSTETRICS AND GYNECOLOGY | Facility: CLINIC | Age: 46
End: 2021-05-17

## 2021-05-17 ENCOUNTER — PATIENT MESSAGE (OUTPATIENT)
Dept: OBSTETRICS AND GYNECOLOGY | Facility: CLINIC | Age: 46
End: 2021-05-17

## 2021-05-18 ENCOUNTER — PATIENT MESSAGE (OUTPATIENT)
Dept: OBSTETRICS AND GYNECOLOGY | Facility: CLINIC | Age: 46
End: 2021-05-18

## 2021-05-18 RX ORDER — MEDROXYPROGESTERONE ACETATE 150 MG/ML
150 INJECTION, SUSPENSION INTRAMUSCULAR
Qty: 4 ML | Refills: 3 | Status: SHIPPED | OUTPATIENT
Start: 2021-05-18 | End: 2022-02-13

## 2022-05-03 DIAGNOSIS — Z12.31 OTHER SCREENING MAMMOGRAM: ICD-10-CM

## 2022-05-11 ENCOUNTER — PATIENT MESSAGE (OUTPATIENT)
Dept: ADMINISTRATIVE | Facility: HOSPITAL | Age: 47
End: 2022-05-11
Payer: MEDICAID

## 2022-05-31 ENCOUNTER — PATIENT MESSAGE (OUTPATIENT)
Dept: ADMINISTRATIVE | Facility: HOSPITAL | Age: 47
End: 2022-05-31
Payer: MEDICAID

## 2022-06-08 DIAGNOSIS — Z12.11 COLON CANCER SCREENING: ICD-10-CM

## 2022-07-27 DIAGNOSIS — Z12.11 COLON CANCER SCREENING: ICD-10-CM

## 2022-08-01 ENCOUNTER — PATIENT MESSAGE (OUTPATIENT)
Dept: ADMINISTRATIVE | Facility: HOSPITAL | Age: 47
End: 2022-08-01
Payer: MEDICAID

## 2022-08-24 ENCOUNTER — PATIENT MESSAGE (OUTPATIENT)
Dept: ADMINISTRATIVE | Facility: HOSPITAL | Age: 47
End: 2022-08-24
Payer: MEDICAID

## 2022-10-10 ENCOUNTER — PATIENT MESSAGE (OUTPATIENT)
Dept: ADMINISTRATIVE | Facility: HOSPITAL | Age: 47
End: 2022-10-10
Payer: MEDICAID

## 2022-12-28 ENCOUNTER — TELEPHONE (OUTPATIENT)
Dept: OBSTETRICS AND GYNECOLOGY | Facility: CLINIC | Age: 47
End: 2022-12-28
Payer: MEDICAID

## 2023-01-17 ENCOUNTER — PATIENT MESSAGE (OUTPATIENT)
Dept: ADMINISTRATIVE | Facility: HOSPITAL | Age: 48
End: 2023-01-17
Payer: MEDICAID

## 2025-02-19 ENCOUNTER — HOSPITAL ENCOUNTER (EMERGENCY)
Facility: HOSPITAL | Age: 50
Discharge: HOME | End: 2025-02-19
Attending: EMERGENCY MEDICINE

## 2025-02-19 VITALS
RESPIRATION RATE: 20 BRPM | OXYGEN SATURATION: 100 % | BODY MASS INDEX: 25.61 KG/M2 | HEIGHT: 64 IN | SYSTOLIC BLOOD PRESSURE: 127 MMHG | WEIGHT: 150 LBS | DIASTOLIC BLOOD PRESSURE: 71 MMHG | HEART RATE: 60 BPM | TEMPERATURE: 98 F

## 2025-02-19 DIAGNOSIS — R42 LIGHT HEADEDNESS: ICD-10-CM

## 2025-02-19 DIAGNOSIS — H81.399 PERIPHERAL VERTIGO, UNSPECIFIED LATERALITY: Primary | ICD-10-CM

## 2025-02-19 LAB
ALBUMIN SERPL BCP-MCNC: 4 G/DL (ref 3.5–5.2)
ALP SERPL-CCNC: 79 U/L (ref 40–150)
ALT SERPL W/O P-5'-P-CCNC: 11 U/L (ref 10–44)
ANION GAP SERPL CALC-SCNC: 11 MMOL/L (ref 8–16)
AST SERPL-CCNC: 15 U/L (ref 10–40)
B-HCG UR QL: NEGATIVE
BASOPHILS # BLD AUTO: 0.06 K/UL (ref 0–0.2)
BASOPHILS NFR BLD: 0.8 % (ref 0–1.9)
BILIRUB SERPL-MCNC: 0.4 MG/DL (ref 0.1–1)
BILIRUB UR QL STRIP: NEGATIVE
BUN SERPL-MCNC: 9 MG/DL (ref 6–20)
CALCIUM SERPL-MCNC: 9 MG/DL (ref 8.7–10.5)
CHLORIDE SERPL-SCNC: 109 MMOL/L (ref 95–110)
CLARITY UR: CLEAR
CO2 SERPL-SCNC: 22 MMOL/L (ref 23–29)
COLOR UR: YELLOW
CREAT SERPL-MCNC: 0.9 MG/DL (ref 0.5–1.4)
CTP QC/QA: YES
DIFFERENTIAL METHOD BLD: ABNORMAL
EOSINOPHIL # BLD AUTO: 0.1 K/UL (ref 0–0.5)
EOSINOPHIL NFR BLD: 1.4 % (ref 0–8)
ERYTHROCYTE [DISTWIDTH] IN BLOOD BY AUTOMATED COUNT: 14.4 % (ref 11.5–14.5)
EST. GFR  (NO RACE VARIABLE): >60 ML/MIN/1.73 M^2
GLUCOSE SERPL-MCNC: 91 MG/DL (ref 70–110)
GLUCOSE UR QL STRIP: NEGATIVE
HCT VFR BLD AUTO: 41.8 % (ref 37–48.5)
HGB BLD-MCNC: 14.3 G/DL (ref 12–16)
HGB UR QL STRIP: ABNORMAL
IMM GRANULOCYTES # BLD AUTO: 0.01 K/UL (ref 0–0.04)
IMM GRANULOCYTES NFR BLD AUTO: 0.1 % (ref 0–0.5)
KETONES UR QL STRIP: NEGATIVE
LEUKOCYTE ESTERASE UR QL STRIP: ABNORMAL
LYMPHOCYTES # BLD AUTO: 4.2 K/UL (ref 1–4.8)
LYMPHOCYTES NFR BLD: 54.7 % (ref 18–48)
MAGNESIUM SERPL-MCNC: 2 MG/DL (ref 1.6–2.6)
MCH RBC QN AUTO: 31.9 PG (ref 27–31)
MCHC RBC AUTO-ENTMCNC: 34.2 G/DL (ref 32–36)
MCV RBC AUTO: 93 FL (ref 82–98)
MICROSCOPIC COMMENT: ABNORMAL
MONOCYTES # BLD AUTO: 0.4 K/UL (ref 0.3–1)
MONOCYTES NFR BLD: 4.5 % (ref 4–15)
NEUTROPHILS # BLD AUTO: 3 K/UL (ref 1.8–7.7)
NEUTROPHILS NFR BLD: 38.5 % (ref 38–73)
NITRITE UR QL STRIP: NEGATIVE
NRBC BLD-RTO: 0 /100 WBC
PH UR STRIP: 7 [PH] (ref 5–8)
PLATELET # BLD AUTO: 290 K/UL (ref 150–450)
PMV BLD AUTO: 10.1 FL (ref 9.2–12.9)
POCT GLUCOSE: 98 MG/DL (ref 70–110)
POTASSIUM SERPL-SCNC: 4.2 MMOL/L (ref 3.5–5.1)
PROT SERPL-MCNC: 7.5 G/DL (ref 6–8.4)
PROT UR QL STRIP: NEGATIVE
RBC # BLD AUTO: 4.48 M/UL (ref 4–5.4)
RBC #/AREA URNS HPF: 14 /HPF (ref 0–4)
SODIUM SERPL-SCNC: 142 MMOL/L (ref 136–145)
SP GR UR STRIP: 1.02 (ref 1–1.03)
SQUAMOUS #/AREA URNS HPF: 4 /HPF
TROPONIN I SERPL DL<=0.01 NG/ML-MCNC: <0.006 NG/ML (ref 0–0.03)
URN SPEC COLLECT METH UR: ABNORMAL
UROBILINOGEN UR STRIP-ACNC: NEGATIVE EU/DL
WBC # BLD AUTO: 7.74 K/UL (ref 3.9–12.7)
WBC #/AREA URNS HPF: 3 /HPF (ref 0–5)

## 2025-02-19 PROCEDURE — 83735 ASSAY OF MAGNESIUM: CPT | Performed by: NURSE PRACTITIONER

## 2025-02-19 PROCEDURE — 63600175 PHARM REV CODE 636 W HCPCS: Performed by: NURSE PRACTITIONER

## 2025-02-19 PROCEDURE — 80053 COMPREHEN METABOLIC PANEL: CPT | Performed by: NURSE PRACTITIONER

## 2025-02-19 PROCEDURE — 81025 URINE PREGNANCY TEST: CPT | Performed by: NURSE PRACTITIONER

## 2025-02-19 PROCEDURE — 25500020 PHARM REV CODE 255: Performed by: EMERGENCY MEDICINE

## 2025-02-19 PROCEDURE — 99285 EMERGENCY DEPT VISIT HI MDM: CPT | Mod: 25

## 2025-02-19 PROCEDURE — 82962 GLUCOSE BLOOD TEST: CPT

## 2025-02-19 PROCEDURE — 96374 THER/PROPH/DIAG INJ IV PUSH: CPT

## 2025-02-19 PROCEDURE — 85025 COMPLETE CBC W/AUTO DIFF WBC: CPT | Performed by: NURSE PRACTITIONER

## 2025-02-19 PROCEDURE — 84484 ASSAY OF TROPONIN QUANT: CPT | Performed by: NURSE PRACTITIONER

## 2025-02-19 PROCEDURE — 25000003 PHARM REV CODE 250: Performed by: EMERGENCY MEDICINE

## 2025-02-19 PROCEDURE — 81000 URINALYSIS NONAUTO W/SCOPE: CPT | Performed by: NURSE PRACTITIONER

## 2025-02-19 RX ORDER — SCOPOLAMINE 1 MG/3D
1 PATCH, EXTENDED RELEASE TRANSDERMAL
Qty: 3 PATCH | Refills: 0 | Status: SHIPPED | OUTPATIENT
Start: 2025-02-19 | End: 2025-02-28

## 2025-02-19 RX ORDER — MECLIZINE HCL 12.5 MG 12.5 MG/1
50 TABLET ORAL
Status: COMPLETED | OUTPATIENT
Start: 2025-02-19 | End: 2025-02-19

## 2025-02-19 RX ORDER — DIAZEPAM 2 MG/1
2 TABLET ORAL
Status: COMPLETED | OUTPATIENT
Start: 2025-02-19 | End: 2025-02-19

## 2025-02-19 RX ORDER — ONDANSETRON HYDROCHLORIDE 2 MG/ML
4 INJECTION, SOLUTION INTRAVENOUS
Status: COMPLETED | OUTPATIENT
Start: 2025-02-19 | End: 2025-02-19

## 2025-02-19 RX ORDER — DIAZEPAM 2 MG/1
2 TABLET ORAL EVERY 8 HOURS PRN
Qty: 10 TABLET | Refills: 0 | Status: SHIPPED | OUTPATIENT
Start: 2025-02-19 | End: 2025-03-21

## 2025-02-19 RX ORDER — MECLIZINE HYDROCHLORIDE 50 MG/1
25 TABLET ORAL 3 TIMES DAILY PRN
Qty: 20 TABLET | Refills: 0 | Status: SHIPPED | OUTPATIENT
Start: 2025-02-19

## 2025-02-19 RX ADMIN — MECLIZINE 50 MG: 12.5 TABLET ORAL at 04:02

## 2025-02-19 RX ADMIN — ONDANSETRON 4 MG: 2 INJECTION INTRAMUSCULAR; INTRAVENOUS at 04:02

## 2025-02-19 RX ADMIN — DIAZEPAM 2 MG: 2 TABLET ORAL at 06:02

## 2025-02-19 RX ADMIN — IOHEXOL 100 ML: 350 INJECTION, SOLUTION INTRAVENOUS at 07:02

## 2025-02-19 NOTE — ED NOTES
Patient reports intermittent lightheadedness that started ~3 days ago. Patient endorses nausea, no emesis. Denies acute pain.

## 2025-02-19 NOTE — FIRST PROVIDER EVALUATION
Emergency Department TeleTriage Encounter Note      CHIEF COMPLAINT    Chief Complaint   Patient presents with    Dizziness     Pt states that she has been having intermittent dizziness for 3 days. Pt states that dizziness is worsening. Tearful in triage. Pt states that she is also having nausea        VITAL SIGNS   Initial Vitals   BP Pulse Resp Temp SpO2   02/19/25 1134 02/19/25 1134 02/19/25 1134 02/19/25 1136 02/19/25 1134   (!) 143/80 (!) 112 16 98.3 °F (36.8 °C) 100 %      MAP       --                   ALLERGIES    Review of patient's allergies indicates:  No Known Allergies    PROVIDER TRIAGE NOTE  This is a teletriage evaluation of a 49 y.o. female presenting to the ED complaining of lightheadedness intermittently for three days. Associated nausea. Denies CP, SOB, n/v/d. Does have headache.  No focal weakness.    Alert, sitting upright, no distress.     Initial orders will be placed and care will be transferred to an alternate provider when patient is roomed for a full evaluation. Any additional orders and the final disposition will be determined by that provider.         ORDERS  Labs Reviewed - No data to display    ED Orders (720h ago, onward)      Start Ordered     Status Ordering Provider    02/19/25 1400 02/19/25 1240  Vital Signs  Every 2 hours         Ordered LUKE VERGARA N.    02/19/25 1245 02/19/25 1240  ondansetron injection 4 mg  ED 1 Time         Ordered LUKE VERGARA N.    02/19/25 1241 02/19/25 1240  Orthostatic vital signs  Once         Ordered LUKE VERGARA N.    02/19/25 1241 02/19/25 1240  POCT urine pregnancy  Once         Ordered LUKE VERGARA N.    02/19/25 1240 02/19/25 1240  CBC auto differential  STAT         Ordered LUKE VERGARA N.    02/19/25 1240 02/19/25 1240  Comprehensive metabolic panel  STAT         Ordered LUKE VERGARA.    02/19/25 1240 02/19/25 1240  Insert Saline lock IV  Once         Ordered URSULA,  LUKE N.    02/19/25 1240 02/19/25 1240  POCT glucose  Once         Ordered LUKE VERGARA N.    02/19/25 1240 02/19/25 1240  EKG 12-lead  Once         Ordered URSULA, LUKE N.    02/19/25 1240 02/19/25 1240  Cardiac Monitoring - Adult  Continuous        Comments: Notify Physician If:    Ordered LUKE VERGARA N.    02/19/25 1240 02/19/25 1240  Pulse Oximetry Continuous  Continuous         Ordered LUKE VERGARA N.    02/19/25 1240 02/19/25 1240  Troponin I  STAT         Ordered LUKE VERGARA N.    02/19/25 1240 02/19/25 1240  Urinalysis, Reflex to Urine Culture Urine, Clean Catch  STAT         Ordered LUKE VERGARA N.    02/19/25 1240 02/19/25 1240  Magnesium  STAT         Ordered LUKE VERGARA              Virtual Visit Note: The provider triage portion of this emergency department evaluation and documentation was performed via [email or web address], a HIPAA-compliant telemedicine application, in concert with a tele-presenter in the room. A face to face patient evaluation with one of my colleagues will occur once the patient is placed in an emergency department room.      DISCLAIMER: This note was prepared with [employer] voice recognition transcription software. Garbled syntax, mangled pronouns, and other bizarre constructions may be attributed to that software system.

## 2025-02-19 NOTE — ED PROVIDER NOTES
Encounter Date: 2025       History     Chief Complaint   Patient presents with    Dizziness     Pt states that she has been having intermittent dizziness for 3 days. Pt states that dizziness is worsening. Tearful in triage. Pt states that she is also having nausea      The patient is a 41-year-old female presents to the ED with complaint of dizziness.  Patient reports 1 day of dizziness described as room spinning worse with rapid head movement and improved with rest.  Denies any nausea vomiting ringing in her ears, feelings of off balance, numbness,  tingling, or extremity weakness.  She denies any history of vertigo when questioned.      Review of patient's allergies indicates:  No Known Allergies  Past Medical History:   Diagnosis Date    Incarceration 2019     Past Surgical History:   Procedure Laterality Date     SECTION      x2    TUBAL LIGATION       Family History   Problem Relation Name Age of Onset    Hypertension Mother      Diabetes Mother      Hyperlipidemia Mother      Thyroid cancer Mother      Hypertension Sister      Hypertension Brother      Diabetes Brother      Kidney failure Brother      Blindness Brother      Heart attack Brother      Breast cancer Maternal Aunt      Colon cancer Neg Hx       Social History[1]  Review of Systems   Constitutional:  Negative for chills and fever.   Eyes:  Negative for photophobia and visual disturbance.   Respiratory:  Negative for chest tightness.    Cardiovascular:  Negative for chest pain and leg swelling.   Gastrointestinal:  Negative for abdominal pain, nausea and vomiting.   Musculoskeletal:  Negative for back pain and joint swelling.   Neurological:  Positive for dizziness. Negative for tremors, seizures, syncope, facial asymmetry, speech difficulty, weakness, light-headedness, numbness and headaches.   Psychiatric/Behavioral:  Negative for agitation and confusion.        Physical Exam     Initial Vitals   BP Pulse Resp Temp SpO2    02/19/25 1134 02/19/25 1134 02/19/25 1134 02/19/25 1136 02/19/25 1134   (!) 143/80 (!) 112 16 98.3 °F (36.8 °C) 100 %      MAP       --                Physical Exam    Vitals reviewed.  Constitutional: She appears well-developed and well-nourished.   HENT:   Head: Normocephalic and atraumatic.   Right Ear: External ear normal.   Left Ear: External ear normal.   Eyes: Conjunctivae and EOM are normal. Pupils are equal, round, and reactive to light.   Left side fatigable nystagmus   Neck:   Normal range of motion.  Cardiovascular:  Normal rate, regular rhythm, normal heart sounds and intact distal pulses.           Pulmonary/Chest: Breath sounds normal.   Abdominal: Abdomen is soft. Bowel sounds are normal.   Musculoskeletal:         General: Normal range of motion.      Cervical back: Normal range of motion.     Neurological: She is alert and oriented to person, place, and time. She has normal strength.   No focal neurological deficit appreciated.    Strength 5/5 in all four extremities.  Sensation grossly in tact  MAEW  GCS 15  AAOX3   Gait WNL    Skin: Skin is warm. Capillary refill takes less than 2 seconds.   Psychiatric: She has a normal mood and affect.         ED Course   Procedures  Labs Reviewed   CBC W/ AUTO DIFFERENTIAL - Abnormal       Result Value    WBC 7.74      RBC 4.48      Hemoglobin 14.3      Hematocrit 41.8      MCV 93      MCH 31.9 (*)     MCHC 34.2      RDW 14.4      Platelets 290      MPV 10.1      Immature Granulocytes 0.1      Gran # (ANC) 3.0      Immature Grans (Abs) 0.01      Lymph # 4.2      Mono # 0.4      Eos # 0.1      Baso # 0.06      nRBC 0      Gran % 38.5      Lymph % 54.7 (*)     Mono % 4.5      Eosinophil % 1.4      Basophil % 0.8      Differential Method Automated     COMPREHENSIVE METABOLIC PANEL - Abnormal    Sodium 142      Potassium 4.2      Chloride 109      CO2 22 (*)     Glucose 91      BUN 9      Creatinine 0.9      Calcium 9.0      Total Protein 7.5      Albumin 4.0       Total Bilirubin 0.4      Alkaline Phosphatase 79      AST 15      ALT 11      eGFR >60      Anion Gap 11     URINALYSIS, REFLEX TO URINE CULTURE - Abnormal    Specimen UA Urine, Clean Catch      Color, UA Yellow      Appearance, UA Clear      pH, UA 7.0      Specific Gravity, UA 1.020      Protein, UA Negative      Glucose, UA Negative      Ketones, UA Negative      Bilirubin (UA) Negative      Occult Blood UA 1+ (*)     Nitrite, UA Negative      Urobilinogen, UA Negative      Leukocytes, UA Trace (*)     Narrative:     Specimen Source->Urine   URINALYSIS MICROSCOPIC - Abnormal    RBC, UA 14 (*)     WBC, UA 3      Squam Epithel, UA 4      Microscopic Comment SEE COMMENT      Narrative:     Specimen Source->Urine   TROPONIN I    Troponin I <0.006     MAGNESIUM    Magnesium 2.0     POCT URINE PREGNANCY    POC Preg Test, Ur Negative       Acceptable Yes     POCT GLUCOSE    POCT Glucose 98     POCT GLUCOSE MONITORING CONTINUOUS     EKG Readings: (Independently Interpreted)   Initial Reading: No STEMI. Rhythm: Normal Sinus Rhythm. Heart Rate: 73. Conduction: Normal. ST Segments: Normal ST Segments. T Waves: Normal.   Normal sinus rhythm; sinus arrhythmia; no STEMI       Imaging Results              CTA Head and Neck (xpd) (Final result)  Result time 02/19/25 19:33:55      Final result by Yoshi Llanes DO (02/19/25 19:33:55)                   Impression:      No acute intracranial abnormality.  No large vessel occlusion or high-grade stenosis.      Electronically signed by: Yoshi Llanes  Date:    02/19/2025  Time:    19:33               Narrative:    EXAMINATION:  CTA HEAD AND NECK (XPD)    CLINICAL HISTORY:  Dizziness, persistent/recurrent, cardiac or vascular cause suspected;    TECHNIQUE:  Non contrast low dose axial images were obtained though the head. CT angiogram was performed from the level of the grecia to the top of the head following the IV administration of 100mL of Omnipaque 350.    Sagittal and coronal reconstructions and maximum intensity projection reconstructions were performed. Arterial stenosis percentages are based on NASCET measurement criteria. An immediate post-contrast CT head was also performed. RapidAI LVO was utilized to measure arterial blood flow in the brain.    COMPARISON:  None available.    FINDINGS:  CT head: The ventricles are normal in size without evidence of hydrocephalus. The brain parenchyma is within normal limits.  No parenchymal mass, edema or major vascular distribution infarct. There is no intracranial hemorrhage (agree with rapid AI assessment).  No extra-axial blood or fluid collection.    The cranium is intact.  Mastoid air cells and paranasal sinuses are clear.      CTA head:    Anterior circulation: The bilateral intracranial ICAs are patent and unremarkable. The bilateral anterior and middle cerebral arteries are within normal limits, without hemodynamically significant stenosis or occlusion.    Posterior circulation: Vertebrobasilar system is within normal limits without focal abnormality.  The bilateral posterior cerebral arteries are within normal limits, without hemodynamically significant stenosis or occlusion.    There is no intracranial aneurysm.    CTA neck:    The aortic arch maintains a normal branching pattern.    The common and internal carotid arteries are normal in course and caliber. No significant stenosis in either carotid bifurcation.    The vertebral origins are patent. The cervical vertebral arteries are normal in course and caliber.    There is a small hypodensity in the right thyroid lobe, which can be further evaluated with nonemergent thyroid ultrasound if clinically indicated.  The visualized lung apices are clear.    There is no acute fracture or subluxation of the cervical spine.                                       Medications   ondansetron injection 4 mg (4 mg Intravenous Given 2/19/25 2603)   meclizine tablet 50 mg (50 mg Oral  Given 2/19/25 1651)   diazePAM tablet 2 mg (2 mg Oral Given 2/19/25 1834)   iohexoL (OMNIPAQUE 350) injection 100 mL (100 mLs Intravenous Given 2/19/25 1913)     Medical Decision Making  Amount and/or Complexity of Data Reviewed  Labs:  Decision-making details documented in ED Course.  Radiology: ordered.    Risk  Prescription drug management.               ED Course as of 02/19/25 1959 Wed Feb 19, 2025   1628 RBC, UA(!): 14 [LC]   1628 WBC, UA: 3 [LC]   1628 Blood, UA(!): 1+ [LC]   1628 Leukocyte Esterase, UA(!): Trace [LC]   1628 Sodium: 142 [LC]   1628 Chloride: 109 [LC]   1628 Potassium: 4.2 [LC]   1628 Magnesium : 2.0 [LC]   1628 Troponin I: <0.006 [LC]   1628 WBC: 7.74 [LC]   1628 Hemoglobin: 14.3 [LC]   1628 Hematocrit: 41.8 [LC]   1948 Patient able to ambulate following administration of Valium without difficulty; she reports significant improvement of her dizziness; she reports improvement of her dizziness; CTA head and neck negative; [LC]      ED Course User Index  [LC] Malcolm Fernandez MD                           Clinical Impression:  Final diagnoses:  [R42] Light headedness  [H81.399] Peripheral vertigo, unspecified laterality (Primary)          ED Disposition Condition    Discharge Stable          ED Prescriptions       Medication Sig Dispense Start Date End Date Auth. Provider    meclizine (ANTIVERT) 50 MG tablet Take 0.5 tablets (25 mg total) by mouth 3 (three) times daily as needed for Dizziness. 20 tablet 2/19/2025 -- Malcolm Fernandez MD    diazePAM (VALIUM) 2 MG tablet Take 1 tablet (2 mg total) by mouth every 8 (eight) hours as needed for Anxiety (vertigo). 10 tablet 2/19/2025 3/21/2025 Malcolm Fernandez MD    scopolamine (TRANSDERM-SCOP) 1.3-1.5 mg (1 mg over 3 days) Place 1 patch onto the skin Every 3 (three) days. Only use one patch every 72 hours; do not leave on for prolonged period of time. for 9 days 3 patch 2/19/2025 2/28/2025 Malcolm Fernandez MD          Follow-up  Information       Follow up With Specialties Details Why Contact Info    Virgil Marrero DO Family Medicine Schedule an appointment as soon as possible for a visit   [address]  [place] [address]  [phone]      Sammie Maria MD Otolaryngology Schedule an appointment as soon as possible for a visit   [address]  [address]  [place] [address]  [phone]                 Malcolm Fernandez MD  25         [1]   Social History  Tobacco Use    Smoking status: Former     Current packs/day: 0.00     Average packs/day: 0.3 packs/day for 15.0 years (5.0 ttl pk-yrs)     Types: Cigarettes     Start date:      Quit date:      Years since quittin.1    Smokeless tobacco: Never    Tobacco comments:     quit 10 years ago   Substance Use Topics    Alcohol use: Yes     Comment: socially    Drug use: Never        Malcolm Fernandez MD  25

## 2025-02-20 LAB
OHS QRS DURATION: 72 MS
OHS QTC CALCULATION: 425 MS

## 2025-02-20 NOTE — ED NOTES
Orthostatic Blood Pressure         //  // //   Vital Signs   Pulse 60 63 58   Resp 18 18 18   SpO2 100% 99% 99%   /71 124/71 157/75   MAP (mmHg)      BP Location L arm L arm L arm   BP Method Automatic Automatic Automatic   Patient Position Lying Sitting Standing   Orthostatic VS Yes Yes Yes   Is Patient Symptomatic in this Position?   yes   Associated Signs/Symptoms  --   --   Dizzy

## 2025-02-20 NOTE — ED NOTES
Report received care assumed. Patient returned from CT ambulated to and from the bathroom without difficulty. Denies pain or discomfort at this time. Cardiac monitor, blood pressure and oxygen saturation monitoring intact. Head of bed up, bed low and locked, call light within reach. Denies pain or discomfort at this time.